# Patient Record
Sex: MALE | Race: WHITE
[De-identification: names, ages, dates, MRNs, and addresses within clinical notes are randomized per-mention and may not be internally consistent; named-entity substitution may affect disease eponyms.]

---

## 2018-07-27 ENCOUNTER — HOSPITAL ENCOUNTER (OUTPATIENT)
Dept: HOSPITAL 76 - EMS | Age: 68
Discharge: HOME | End: 2018-07-27
Attending: SURGERY
Payer: MEDICARE

## 2018-07-27 ENCOUNTER — HOSPITAL ENCOUNTER (EMERGENCY)
Dept: HOSPITAL 76 - ED | Age: 68
Discharge: ANOTHER HOSPITAL | End: 2018-07-27
Payer: MEDICARE

## 2018-07-27 VITALS — SYSTOLIC BLOOD PRESSURE: 128 MMHG | DIASTOLIC BLOOD PRESSURE: 74 MMHG

## 2018-07-27 DIAGNOSIS — I21.3: Primary | ICD-10-CM

## 2018-07-27 DIAGNOSIS — E78.00: ICD-10-CM

## 2018-07-27 DIAGNOSIS — I21.9: Primary | ICD-10-CM

## 2018-07-27 LAB
ALBUMIN DIAFP-MCNC: 4.1 G/DL (ref 3.2–5.5)
ALBUMIN/GLOB SERPL: 1.2 {RATIO} (ref 1–2.2)
ALP SERPL-CCNC: 87 IU/L (ref 42–121)
ALT SERPL W P-5'-P-CCNC: 23 IU/L (ref 10–60)
ANION GAP SERPL CALCULATED.4IONS-SCNC: 11 MMOL/L (ref 6–13)
AST SERPL W P-5'-P-CCNC: 27 IU/L (ref 10–42)
BASOPHILS NFR BLD AUTO: 0.1 10^3/UL (ref 0–0.1)
BASOPHILS NFR BLD AUTO: 1.2 %
BILIRUB BLD-MCNC: 1.2 MG/DL (ref 0.2–1)
BUN SERPL-MCNC: 23 MG/DL (ref 6–20)
CALCIUM UR-MCNC: 9.4 MG/DL (ref 8.5–10.3)
CHLORIDE SERPL-SCNC: 99 MMOL/L (ref 101–111)
CO2 SERPL-SCNC: 26 MMOL/L (ref 21–32)
CREAT SERPLBLD-SCNC: 1.3 MG/DL (ref 0.6–1.2)
EOSINOPHIL # BLD AUTO: 0.1 10^3/UL (ref 0–0.7)
EOSINOPHIL NFR BLD AUTO: 0.6 %
ERYTHROCYTE [DISTWIDTH] IN BLOOD BY AUTOMATED COUNT: 12.7 % (ref 12–15)
GFRSERPLBLD MDRD-ARVRAT: 55 ML/MIN/{1.73_M2} (ref 89–?)
GLOBULIN SER-MCNC: 3.5 G/DL (ref 2.1–4.2)
GLUCOSE SERPL-MCNC: 189 MG/DL (ref 70–100)
HGB UR QL STRIP: 16.6 G/DL (ref 14–18)
LIPASE SERPL-CCNC: 23 U/L (ref 22–51)
LYMPHOCYTES # SPEC AUTO: 3.6 10^3/UL (ref 1.5–3.5)
LYMPHOCYTES NFR BLD AUTO: 38.8 %
MAGNESIUM SERPL-MCNC: 1.7 MG/DL (ref 1.7–2.8)
MCH RBC QN AUTO: 30.6 PG (ref 27–31)
MCHC RBC AUTO-ENTMCNC: 34 G/DL (ref 32–36)
MCV RBC AUTO: 90.1 FL (ref 80–94)
MONOCYTES # BLD AUTO: 0.7 10^3/UL (ref 0–1)
MONOCYTES NFR BLD AUTO: 7.9 %
NEUTROPHILS # BLD AUTO: 4.7 10^3/UL (ref 1.5–6.6)
NEUTROPHILS # SNV AUTO: 9.2 X10^3/UL (ref 4.8–10.8)
NEUTROPHILS NFR BLD AUTO: 51.5 %
PDW BLD AUTO: 8 FL (ref 7.4–11.4)
PLATELET # BLD: 271 10^3/UL (ref 130–450)
PROT SPEC-MCNC: 7.6 G/DL (ref 6.7–8.2)
RBC MAR: 5.43 10^6/UL (ref 4.7–6.1)
SODIUM SERPLBLD-SCNC: 136 MMOL/L (ref 135–145)

## 2018-07-27 PROCEDURE — 99284 EMERGENCY DEPT VISIT MOD MDM: CPT

## 2018-07-27 PROCEDURE — 84484 ASSAY OF TROPONIN QUANT: CPT

## 2018-07-27 PROCEDURE — 96375 TX/PRO/DX INJ NEW DRUG ADDON: CPT

## 2018-07-27 PROCEDURE — 80053 COMPREHEN METABOLIC PANEL: CPT

## 2018-07-27 PROCEDURE — 93005 ELECTROCARDIOGRAM TRACING: CPT

## 2018-07-27 PROCEDURE — 96365 THER/PROPH/DIAG IV INF INIT: CPT

## 2018-07-27 PROCEDURE — 71045 X-RAY EXAM CHEST 1 VIEW: CPT

## 2018-07-27 PROCEDURE — 83690 ASSAY OF LIPASE: CPT

## 2018-07-27 PROCEDURE — 99291 CRITICAL CARE FIRST HOUR: CPT

## 2018-07-27 PROCEDURE — 85025 COMPLETE CBC W/AUTO DIFF WBC: CPT

## 2018-07-27 PROCEDURE — 83735 ASSAY OF MAGNESIUM: CPT

## 2018-07-27 PROCEDURE — 36415 COLL VENOUS BLD VENIPUNCTURE: CPT

## 2018-07-27 NOTE — ED PHYSICIAN DOCUMENTATION
PD HPI CHEST PAIN





- Stated complaint


Stated Complaint: SOA,CHEST PX,L ARM NUMBNESS





- Chief complaint


Chief Complaint: Cardiac





- History obtained from


History obtained from: Patient





- History of Present Illness


Timing - onset: How many hours ago (1)


Timing - onset during: Exertion (Walking at [place])


Timing - duration: Hours (1)


Timing - details: Still present


Pain level now: 9


Quality: Pain


Location: Substernal


Radiation: Left upper extremity


Associated symptoms: Shortness of air, Diaphoresis.  No: Nausea, Vomiting


Similar symptoms before: Has not had sx before





- Additional information


Additional information: 


The patient is an otherwise healthy 67-year-old male who presents with 

substernal chest pain that started about 1 hour prior to arrival while he was 

walking on [place].  The pain radiates to his left shoulder.  He 

reports associated mild shortness of breath, and diaphoresis.  He denies nausea 

or vomiting.  He denies history of similar symptoms in the past.  He takes no 

medications.  He has no history of diabetes or hypertension.  He does report 

history of hyperlipidemia.  He quit smoking cigarettes in 1975.  He has no 

family history of early MI.








Review of Systems


Constitutional: denies: Fever


Ears: denies: Tinnitus/ringing


Nose: denies: Congestion


Throat: denies: Sore throat


Cardiac: reports: Chest pain / pressure


Respiratory: reports: Dyspnea (mild).  denies: Cough


GI: denies: Abdominal Pain, Nausea, Vomiting


: denies: Dysuria


Skin: denies: Rash


Musculoskeletal: denies: Back pain


Neurologic: denies: Focal weakness, Numbness, Headache





PD PAST MEDICAL HISTORY





- Past Medical History


Cardiovascular: High cholesterol


Respiratory: None


Neuro: None


Endocrine/Autoimmune: None





- Present Medications


Home Medications: 


 Ambulatory Orders











 Medication  Instructions  Recorded  Confirmed


 


No Known Home Medications [No  07/27/18 07/27/18





Known Home Medications]   














- Allergies


Allergies/Adverse Reactions: 


 Allergies











Allergy/AdvReac Type Severity Reaction Status Date / Time


 


Penicillins Allergy  Hives Verified 07/27/18 07:23














PD ED PE NORMAL





- Vitals


Vital signs reviewed: Yes (Initially hypertensive.)





- General


General: Alert and oriented X 3, Well developed/nourished





- HEENT


HEENT: Atraumatic, Moist mucous membranes





- Neck


Neck: No adenopathy, No JVD





- Cardiac


Cardiac: RRR, No murmur





- Respiratory


Respiratory: No respiratory distress, Clear bilaterally





- Abdomen


Abdomen: Soft, Non tender





- Back


Back: No CVA TTP





- Derm


Derm: No rash





- Extremities


Extremities: No edema, No calf tenderness / cord





- Neuro


Neuro: Alert and oriented X 3, No motor deficit, Normal speech





Results





- Vitals


Vitals: 


 Vital Signs - 24 hr











  07/27/18 07/27/18 07/27/18





  07:00 07:20 07:23


 


Temperature 36.0 C L  


 


Heart Rate 79 79 94


 


Respiratory 12 18 19





Rate   


 


Blood Pressure 144/100 H 141/94 H 141/94 H


 


O2 Saturation 99 97 97














  07/27/18 07/27/18





  07:25 07:43


 


Temperature  


 


Heart Rate 80 77


 


Respiratory 20 20





Rate  


 


Blood Pressure 118/81 H 128/74


 


O2 Saturation 95 98








 Oxygen











O2 Source                      Room air

















- EKG (time done)


  ** 07:07


Rate: Rate (enter#) (83)


Rhythm: NSR


Axis: LAD


Intervals: Prolonged PA


Ischemia: ST elevation c/w ischemia


Other comments: Other comments (ST elevation in anterolateral leads I, aVL, and 

V1 through V5, with reciprocal ST depression in inferior leads III and aVF, 

consistent with acute anterolateral MI.)


Computer interpretation: Disagree with computer





- Labs


Labs: 


 Laboratory Tests











  07/27/18 07/27/18 07/27/18





  07:05 07:05 07:05


 


WBC  9.2  


 


RBC  5.43  


 


Hgb  16.6  


 


Hct  49.0  


 


MCV  90.1  


 


MCH  30.6  


 


MCHC  34.0  


 


RDW  12.7  


 


Plt Count  271  


 


MPV  8.0  


 


Neut # (Auto)  4.7  


 


Lymph # (Auto)  3.6 H  


 


Mono # (Auto)  0.7  


 


Eos # (Auto)  0.1  


 


Baso # (Auto)  0.1  


 


Absolute Nucleated RBC  0.01  


 


Nucleated RBC %  0.1  


 


Sodium   136 


 


Potassium   3.4 L 


 


Chloride   99 L 


 


Carbon Dioxide   26 


 


Anion Gap   11.0 


 


BUN   23 H 


 


Creatinine   1.3 H 


 


Estimated GFR (MDRD)   55 L 


 


Glucose   189 H 


 


Calcium   9.4 


 


Magnesium   1.7 


 


Total Bilirubin   1.2 H 


 


AST   27 


 


ALT   23 


 


Alkaline Phosphatase   87 


 


Troponin I    0.11


 


Total Protein   7.6 


 


Albumin   4.1 


 


Globulin   3.5 


 


Albumin/Globulin Ratio   1.2 


 


Lipase   23 














PD MEDICAL DECISION MAKING





- ED course


Complexity details: reviewed results, re-evaluated patient, considered 

differential, d/w patient, d/w consultant


ED course: 


The patient's presentation is significant for acute anterolateral MI, with ST 

elevations in leads I, aVL, and V1 through V5, with reciprocal ST depressions 

in inferior leads III and aVF.


Treatment in the emergency department included administration of 4 baby aspirin 

orally, sublingual nitroglycerin 2, nitroglycerin infusion, and heparin bolus 

and infusion, as per cardiac protocol.  I discussed his condition with the 

emergency physician at .  Dr. Olvera accepts the patient 

in transfer.  Transfer forms were completed.








- Critical Care


Time(min): 30


Time Includes: Direct patient care, Reassess patient, Document care, Coordinate 

care


Data interpretation: Labs


Procedures excluded from critical care time: EKG





- Sepsis Event


Vital Signs: 


 Vital Signs - 24 hr











  07/27/18 07/27/18 07/27/18





  07:00 07:20 07:23


 


Temperature 36.0 C L  


 


Heart Rate 79 79 94


 


Respiratory 12 18 19





Rate   


 


Blood Pressure 144/100 H 141/94 H 141/94 H


 


O2 Saturation 99 97 97














  07/27/18 07/27/18





  07:25 07:43


 


Temperature  


 


Heart Rate 80 77


 


Respiratory 20 20





Rate  


 


Blood Pressure 118/81 H 128/74


 


O2 Saturation 95 98








 Oxygen











O2 Source                      Room air

















Departure





- Departure


Disposition: 02 Transfer Acute Care Hosp


Clinical Impression: 


Myocardial infarction


Qualifiers:


 Myocardial infarction type: ST elevation myocardial infarction 





Condition: Critical


Discharge Date/Time: 07/27/18 07:55

## 2018-07-27 NOTE — XRAY REPORT
Procedure Date:  07/27/2018   

Accession Number:  [id number] / [id number]                    

Procedure:  XR  - Chest 1 View X-Ray CPT Code:  32993

 

FULL RESULT:

 

 

EXAM:

CHEST RADIOGRAPHY

 

EXAM DATE: 7/27/2018 07:26 AM.

 

CLINICAL HISTORY: Chest pain.

 

COMPARISON: None.

 

TECHNIQUE: 1 view.

 

FINDINGS:

Lungs/Pleura: No focal opacities evident. No pleural effusion. No 

pneumothorax.

 

Mediastinum: Within exam limitations, the cardiomediastinal contour is 

normal.

 

Other: None.

 

IMPRESSION: Normal single view chest.

 

[facility]